# Patient Record
Sex: MALE
[De-identification: names, ages, dates, MRNs, and addresses within clinical notes are randomized per-mention and may not be internally consistent; named-entity substitution may affect disease eponyms.]

---

## 2019-02-04 ENCOUNTER — HOSPITAL ENCOUNTER (INPATIENT)
Dept: HOSPITAL 93 - ER | Age: 84
LOS: 16 days | Discharge: HOME | DRG: 177 | End: 2019-02-20
Attending: INTERNAL MEDICINE | Admitting: INTERNAL MEDICINE
Payer: COMMERCIAL

## 2019-02-04 VITALS — WEIGHT: 112 LBS | BODY MASS INDEX: 18.66 KG/M2 | HEIGHT: 65 IN

## 2019-02-04 DIAGNOSIS — T17.800A: ICD-10-CM

## 2019-02-04 DIAGNOSIS — J98.11: ICD-10-CM

## 2019-02-04 DIAGNOSIS — K26.3: ICD-10-CM

## 2019-02-04 DIAGNOSIS — D63.8: ICD-10-CM

## 2019-02-04 DIAGNOSIS — E43: ICD-10-CM

## 2019-02-04 DIAGNOSIS — R13.19: ICD-10-CM

## 2019-02-04 DIAGNOSIS — E88.09: ICD-10-CM

## 2019-02-04 DIAGNOSIS — J69.0: Primary | ICD-10-CM

## 2019-02-04 DIAGNOSIS — E86.0: ICD-10-CM

## 2019-02-04 DIAGNOSIS — R29.898: ICD-10-CM

## 2019-02-04 DIAGNOSIS — K29.80: ICD-10-CM

## 2019-02-04 DIAGNOSIS — A41.9: ICD-10-CM

## 2019-02-04 DIAGNOSIS — E87.0: ICD-10-CM

## 2019-02-04 DIAGNOSIS — R64: ICD-10-CM

## 2019-02-04 DIAGNOSIS — B37.1: ICD-10-CM

## 2019-02-04 DIAGNOSIS — J91.8: ICD-10-CM

## 2019-02-04 DIAGNOSIS — N17.9: ICD-10-CM

## 2019-02-04 DIAGNOSIS — C76.0: ICD-10-CM

## 2019-02-04 NOTE — NUR
SE RECIBE PACIENTE ALERTA Y ORIENTADA X3 EN CAMA CON ARANDAS ELEVADAS Y
CABEZERA A 45 GRADOS. AREA DE VENOPUNCION PATENTE Y SCOTT DE EDEMA BAJANDO
MEDICAMENTO ORDENADOS. PACIENTE NEIGA DOLOR U OTRO SINTOMA, SE MANTIENE BAJO
OBSERVACION.

## 2019-02-05 PROCEDURE — 3E0F7GC INTRODUCTION OF OTHER THERAPEUTIC SUBSTANCE INTO RESPIRATORY TRACT, VIA NATURAL OR ARTIFICIAL OPENING: ICD-10-PCS | Performed by: INTERNAL MEDICINE

## 2019-02-06 PROCEDURE — 4A033R1 MEASUREMENT OF ARTERIAL SATURATION, PERIPHERAL, PERCUTANEOUS APPROACH: ICD-10-PCS | Performed by: INTERNAL MEDICINE

## 2019-02-06 PROCEDURE — B246ZZZ ULTRASONOGRAPHY OF RIGHT AND LEFT HEART: ICD-10-PCS | Performed by: INTERNAL MEDICINE

## 2019-02-06 PROCEDURE — 02HV33Z INSERTION OF INFUSION DEVICE INTO SUPERIOR VENA CAVA, PERCUTANEOUS APPROACH: ICD-10-PCS | Performed by: INTERNAL MEDICINE

## 2019-02-07 PROCEDURE — BW24ZZZ COMPUTERIZED TOMOGRAPHY (CT SCAN) OF CHEST AND ABDOMEN: ICD-10-PCS | Performed by: INTERNAL MEDICINE

## 2019-02-15 PROCEDURE — 0T9B70Z DRAINAGE OF BLADDER WITH DRAINAGE DEVICE, VIA NATURAL OR ARTIFICIAL OPENING: ICD-10-PCS | Performed by: INTERNAL MEDICINE

## 2019-02-15 PROCEDURE — 30233N1 TRANSFUSION OF NONAUTOLOGOUS RED BLOOD CELLS INTO PERIPHERAL VEIN, PERCUTANEOUS APPROACH: ICD-10-PCS | Performed by: INTERNAL MEDICINE

## 2019-02-16 PROCEDURE — CD271ZZ TOMOGRAPHIC (TOMO) NUCLEAR MEDICINE IMAGING OF GASTROINTESTINAL TRACT USING TECHNETIUM 99M (TC-99M): ICD-10-PCS | Performed by: INTERNAL MEDICINE

## 2019-02-18 PROCEDURE — 0DB98ZX EXCISION OF DUODENUM, VIA NATURAL OR ARTIFICIAL OPENING ENDOSCOPIC, DIAGNOSTIC: ICD-10-PCS | Performed by: INTERNAL MEDICINE

## 2019-02-18 PROCEDURE — 0DH63UZ INSERTION OF FEEDING DEVICE INTO STOMACH, PERCUTANEOUS APPROACH: ICD-10-PCS | Performed by: INTERNAL MEDICINE

## 2019-02-19 PROCEDURE — 3E0336Z INTRODUCTION OF NUTRITIONAL SUBSTANCE INTO PERIPHERAL VEIN, PERCUTANEOUS APPROACH: ICD-10-PCS | Performed by: INTERNAL MEDICINE

## 2019-05-08 NOTE — NUR
[facility] 1951 
[id number] Situation: 
Verbal report received from:Ami Procedure: Procedure(s): 
COLONOSCOPY-NO SEDATION 
ENDOSCOPIC POLYPECTOMY 
COLON BIOPSY Background: 
 
Preoperative diagnosis: IRRITABLE BOWEL SYNDROME, PASSING FLATUS, HX COLON POLYPS Postoperative diagnosis: Colon Polyps Diverticulosis :  Dr. Sharon Garnica Assistant(s): Endoscopy Technician-1: Gatito Mendoza 
Endoscopy RN-1: Jason Gross RN Specimens:  
ID Type Source Tests Collected by Time Destination 1 : transverse colon polyp Preservative Colon, Transverse  Agatha Wu MD 5/8/2019 5409 Pathology 2 : random colon biopsies Preservative   Danae Ordoñez MD 5/8/2019 1992 Pathology H. Pylori  no Assessment: 
Intra-procedure medications Anesthesia gave intra-procedure sedation and medications, see anesthesia flow sheet no Intravenous fluids: NS@ Álvarez Comes Vital signs stable Abdominal assessment: round and soft Recommendation: 
Discharge patient per MD order. Family or Friend Spouse Permission to share finding with family or friend yes SE ORIENTA PTE SOBRE EL TRATAMIENTO ORDENADO POR EL DR WALSH PTE ALERTA Y
CONCIENTE POR 3 RN WOO REALIZA MUESTRAS DE LABORTORIO PTE SE MANIENE EN
OBSERVACION Y BAJO TRATAMIENTO. SE NOTIFICA ESTUDIO PENDIENTE